# Patient Record
Sex: MALE | Race: WHITE | URBAN - METROPOLITAN AREA
[De-identification: names, ages, dates, MRNs, and addresses within clinical notes are randomized per-mention and may not be internally consistent; named-entity substitution may affect disease eponyms.]

---

## 2017-01-07 DIAGNOSIS — N40.0 BENIGN NON-NODULAR PROSTATIC HYPERPLASIA WITHOUT LOWER URINARY TRACT SYMPTOMS: Primary | ICD-10-CM

## 2017-01-09 NOTE — TELEPHONE ENCOUNTER
tamsulosin (FLOMAX) 0.4 MG 24 hr capsule         Last Written Prescription Date: 10/10/16  Last Fill Quantity: 90, # refills: 0  Last Office Visit with [facility], [facility] or [facility] prescribing provider:  10/10/16.     Future Office Visit:      BP Readings from Last 3 Encounters:   10/10/16 102/68   12/30/15 111/69   11/17/15 116/78

## 2017-01-10 RX ORDER — TAMSULOSIN HYDROCHLORIDE 0.4 MG/1
CAPSULE ORAL
Qty: 90 CAPSULE | Refills: 3 | Status: SHIPPED | OUTPATIENT
Start: 2017-01-10

## 2017-01-10 NOTE — TELEPHONE ENCOUNTER
Status: Signed         Expand All Collapse All    tamsulosin (FLOMAX) 0.4 MG 24 hr capsule         Last Written Prescription Date: 10/10/16  Last Fill Quantity: 90, # refills: 0  Last Office Visit with [facility], [facility] or [facility] prescribing provider:  10/10/16.      Future Office Visit:        BP Readings from Last 3 Encounters:    10/10/16  102/68    12/30/15  111/69    11/17/15  116/78                   Refill provided per protocol

## 2017-02-27 ENCOUNTER — TELEPHONE (OUTPATIENT)
Dept: PEDIATRICS | Facility: CLINIC | Age: 77
End: 2017-02-27

## 2017-02-27 NOTE — TELEPHONE ENCOUNTER
Patient walked into clinic to get appt with Dr. Glass.  Triaged patient.  He was walking down steps and the steps broke and he fell about 10 feet.  The stairs fell on top of him on his back.  He is in pain, but able to walk.  terese sosa.  Informed patient he needs to go to ER to be evaluated and possible imaging done.  He is with his son.  Recommended his son drive, patient states I drove here, he can follow me.  Patient agreed to go to  ER.    Nanette Aly RN,   [facility]

## 2017-03-01 NOTE — TELEPHONE ENCOUNTER
Attempted to contact patient.  No answer:  Busy signal.    Nanette Aly RN,   [facility]

## 2017-03-02 ENCOUNTER — RADIANT APPOINTMENT (OUTPATIENT)
Dept: GENERAL RADIOLOGY | Facility: CLINIC | Age: 77
End: 2017-03-02
Attending: PREVENTIVE MEDICINE
Payer: COMMERCIAL

## 2017-03-02 ENCOUNTER — OFFICE VISIT (OUTPATIENT)
Dept: ORTHOPEDICS | Facility: CLINIC | Age: 77
End: 2017-03-02
Payer: COMMERCIAL

## 2017-03-02 VITALS — DIASTOLIC BLOOD PRESSURE: 82 MMHG | SYSTOLIC BLOOD PRESSURE: 126 MMHG | HEART RATE: 73 BPM

## 2017-03-02 DIAGNOSIS — M79.605 LEFT LEG PAIN: ICD-10-CM

## 2017-03-02 DIAGNOSIS — M18.11 DEGENERATIVE ARTHRITIS OF THUMB, RIGHT: ICD-10-CM

## 2017-03-02 DIAGNOSIS — M25.541 PAIN IN THUMB JOINT WITH MOVEMENT OF RIGHT HAND: Primary | ICD-10-CM

## 2017-03-02 DIAGNOSIS — S90.02XA CONTUSION OF ANKLE, LEFT: ICD-10-CM

## 2017-03-02 DIAGNOSIS — M25.541 PAIN IN THUMB JOINT WITH MOVEMENT OF RIGHT HAND: ICD-10-CM

## 2017-03-02 PROCEDURE — 73140 X-RAY EXAM OF FINGER(S): CPT | Mod: RT | Performed by: RADIOLOGY

## 2017-03-02 PROCEDURE — 99213 OFFICE O/P EST LOW 20 MIN: CPT | Performed by: PREVENTIVE MEDICINE

## 2017-03-02 PROCEDURE — 73590 X-RAY EXAM OF LOWER LEG: CPT | Mod: LT | Performed by: RADIOLOGY

## 2017-03-02 ASSESSMENT — PAIN SCALES - GENERAL: PAINLEVEL: EXTREME PAIN (8)

## 2017-03-02 NOTE — TELEPHONE ENCOUNTER
Called patient, left message with phone number to call back.   Linda Rosales RN, [facility]

## 2017-03-02 NOTE — MR AVS SNAPSHOT
After Visit Summary   3/2/2017    Andrés Hagen    MRN: [id number]           Patient Information     Date Of Birth          1940        Visit Information        Provider Department      3/2/2017 2:00 PM Jair Martel MD [facility]        Today's Diagnoses     Pain in thumb joint with movement of right hand    -  1    Left leg pain        Contusion of ankle, left        Degenerative arthritis of thumb, right           Follow-ups after your visit        Follow-up notes from your care team     Return in about 1 week (around 3/9/2017).      Your next 10 appointments already scheduled     Mar 20, 2017  7:00 AM CDT   Return Visit with Jair Martel MD   [facility] ([facility]    [address] [place] [id number] [phone]            Apr 10, 2017  8:40 AM CDT   Return Visit with Juventino Glass MD   [facility] ([facility])    [address] [place] [id number] [phone]              Who to contact     If you have questions or need follow up information about today's clinic visit or your schedule please contact [facility] directly at [phone].  Normal or non-critical lab and imaging results will be communicated to you by MyChart, letter or phone within 4 business days after the clinic has received the results. If you do not hear from us within 7 days, please contact the clinic through [employer] or phone. If you have a critical or abnormal lab result, we will notify you by phone as soon as possible.  Submit refill requests through [employer] or call your pharmacy and they will forward the refill request to us. Please allow 3 business days for your refill to be completed.          Additional Information About Your Visit        [employer] Information     [employer] is an electronic gateway that provides easy, online access to your medical records. With  [employer], you can request a clinic appointment, read your test results, renew a prescription or communicate with your care team.     To sign up for [email or web address] visit the website at [email or web address]   You will be asked to enter the access code listed below, as well as some personal information. Please follow the directions to create your username and password.     Your access code is: [email or web address]  Expires: 2017  5:12 PM     Your access code will  in 90 days. If you need help or a new code, please contact your [facility] or call [phone] for assistance.        Care EveryWhere ID     This is your Care EveryWhere ID. This could be used by other organizations to access your [place] medical records  [id number]        Your Vitals Were     Pulse                   73            Blood Pressure from Last 3 Encounters:   17 126/82   10/10/16 102/68   12/30/15 111/69    Weight from Last 3 Encounters:   10/10/16 100 kg (220 lb 8 oz)   12/30/15 103.9 kg (229 lb 1.6 oz)   11/17/15 102.5 kg (226 lb)               Primary Care Provider Office Phone # Fax #    Juventino Glass [phone]       [facility] [address]  [facility] [address]        Thank you!     Thank you for choosing [facility]  for your care. Our goal is always to provide you with excellent care. Hearing back from our patients is one way we can continue to improve our services. Please take a few minutes to complete the written survey that you may receive in the mail after your visit with us. Thank you!             Your Updated Medication List - Protect others around you: Learn how to safely use, store and throw away your medicines at www.disposemymeds.org.          This list is accurate as of: 3/2/17  5:12 PM.  Always use your most recent med list.                   Brand Name Dispense Instructions for use    ASPIRIN PO      Take 81 mg by mouth daily        atorvastatin 80 MG tablet    LIPITOR    90 tablet    TAKE 1 TABLET EVERY DAY       DAILY MULTIVITAMIN PO      Take 1 tablet by mouth daily       glucosamine-chondroitinoitin Caps      Take 1 tablet by mouth 2 times daily       losartan 25 MG tablet    COZAAR    90 tablet    TAKE 1 TABLET EVERY DAY       metoprolol 25 MG 24 hr tablet    TOPROL-XL    45 tablet    Take 1/2 tablet daily       OMEGA-3 FISH OIL PO      Take 1 capsule by mouth 2 times daily (with meals)       tamsulosin 0.4 MG capsule    FLOMAX    90 capsule    TAKE 1 CAPSULE EVERY DAY

## 2017-03-02 NOTE — TELEPHONE ENCOUNTER
JUDIE for Dr. Glass.    Patient called back. Discussed he did go to the [facility].  They ruled out a hip, pelvis or back fracture.  He rates pain 1/10 in his hip and back.    He states he had a complaint of R thumb pain, rates 10/10, it is black and blue.  He did not have this evaluated at the ER due to they were focused on hip and back.      He has taken Tylenol 500 mg 3 tabs 3 times per day yesterday and today has taken 3 tabs.  Not helping.  Discussed maximum dosage is 2 tabs 3 times per day.    Called [facility], they see the complain in the initial triage note, but then he did not complain again, so he cannot go to the ER for evaluation unless he pays for visit.    Ortho has openings today, patient is in agreement to see ortho.     Schedule with Dr. Martel at 2:00 pm today to evaluate his thumb.    Linda Rosales RN, [facility]

## 2017-03-02 NOTE — Clinical Note
Andrés Whiting will discuss with you a possible referral to neurology to address the right arm tremor that has been worsening over the past 6 months

## 2017-03-02 NOTE — PROGRESS NOTES
HISTORY OF PRESENT ILLNESS  Mr. Hagen is a pleasant 76 year old year old male who presents to clinic today for evaluation of right thumb pain and left lower extremity pain.    Patient states that on Monday 2/27/2017 he fell about 10 feet while landing of his stomach while he walking down the stairs which were recently built by his son. Stairs were recently built by his son and suddenly broke leading patient to fall. Patient states that since then he developed back, left hip, left ankle and right thumb pain. Patient was seen at [facility] on 2/27/2017 where he had Xrays of back and left hip that were negative for fracture.  Back and hip pan have been getting better but his thumb pain has not been improving    Per patient's report right thumb pain gets worse with movement and improves at rest. Intensity is 8/10 and pain is dull. This has been accompanied by swelling and ecchymosis. He has been taking tylenol with no relief of pain. Patient has been unable to eat or perform any activities with his right hand due to pain.    During ROS, patient states he has pain in lateral aspect of left lower extremity which started after the fall. It was also noticed patient had a right arm tremor at rest that worsens with movement.       MEDICAL HISTORY  Patient Active Problem List   Diagnosis     Coronary artery disease involving coronary bypass graft of native heart without angina pectoris     Bilateral leg edema     Hyperlipidemia with target LDL less than 100     Obesity (BMI 30-39.9)     CKD (chronic kidney disease) stage 3, GFR 30-59 ml/min     Essential and other specified forms of tremor     Prediabetes     S/P balloon mitral valvuloplasty     Elevated alkaline phosphatase level     Cataract     Benign non-nodular prostatic hyperplasia without lower urinary tract symptoms     Essential hypertension with goal blood pressure less than 130/80     Essential tremor     Mild concentric left ventricular hypertrophy (LVH)     Hepatic  lesion     Calculus of gallbladder without cholecystitis without obstruction     Hepatic cyst     Renal cyst       Current Outpatient Prescriptions   Medication Sig Dispense Refill     tamsulosin (FLOMAX) 0.4 MG capsule TAKE 1 CAPSULE EVERY DAY 90 capsule 3     atorvastatin (LIPITOR) 80 MG tablet TAKE 1 TABLET EVERY DAY 90 tablet 2     losartan (COZAAR) 25 MG tablet TAKE 1 TABLET EVERY DAY 90 tablet 2     metoprolol (TOPROL-XL) 25 MG 24 hr tablet Take 1/2 tablet daily 45 tablet 2     Multiple Vitamin (DAILY MULTIVITAMIN PO) Take 1 tablet by mouth daily       Glucosamine-Chondroit-Vit C-Mn (GLUCOSAMINE-CHONDROITINOITIN) CAPS Take 1 tablet by mouth 2 times daily       Omega-3 Fatty Acids (OMEGA-3 FISH OIL PO) Take 1 capsule by mouth 2 times daily (with meals)       ASPIRIN PO Take 81 mg by mouth daily         No Known Allergies    Family History   Problem Relation Age of Onset     HEART DISEASE Mother      Kidney Cancer Mother      HEART DISEASE Father      Kidney Cancer Sister        Additional medical/Social/Surgical histories reviewed in [facility] and updated as appropriate.     REVIEW OF SYSTEMS (3/2/2017)  10 point ROS of systems including Constitutional, Eyes, Respiratory, Cardiovascular, Gastroenterology, Genitourinary, Integumentary, Musculoskeletal, Psychiatric were all negative except for pertinent positives noted in my HPI.     PHYSICAL EXAM  Vitals:    03/02/17 1406   BP: 126/82   Pulse: 73     Vital Signs: /82  Pulse 73 Patient declined being weighed. There is no height or weight on file to calculate BMI.    General  - normal appearance, in no obvious distress  CV  - normal radial pulse  Pulm  - normal respiratory pattern, non-labored  Musculoskeletal - wrist  - inspection: normal joint alignment, no obvious deformity, no swelling  - palpation: no bony or soft tissue tenderness, no tenderness at the anatomical snuffbox  - ROM:  90 deg flexion   70 deg extension   25 deg abduction   65 deg adduction  -  strength: 5/5  strength, 5/5 flexion, extension, pronation, supination, adduction, abduction  - special tests:  (-) Tinel's  (-) Finkelstein  (-) Phalen  (-) Linares click test  (-) ulnar impaction     Neuro  - no sensory or motor deficit, grossly normal coordination, normal muscle tone  Skin  - no ecchymosis, erythema, warmth, or induration, no obvious rash  Psych  - interactive, appropriate, normal mood and affect    Upper extremities  -Right thumb: diffuse tenderness, swelling and ecchymosis noted in right thumb. Limited abduction and flexion of distal interphalangeal joint due to pain.  -Significant tremor noted in right arm    Back:  Paravertebral muscles noted to be tight  Adequate rotation, flexion and extension  Negative straight leg raise and slump test    Lower Extremities:   -Left hip: adequate ROM   -Small ecchymosis and tenderness to palpation over lateral aspects of left extremity and left ankle. No edema noted.       ASSESSMENT & PLAN    Mr. Hagen is a pleasant 76 year old year old male who presents to clinic today for evaluation of right thumb pain, left hip pain, back pain  and left lower extremity pain after a fall on 2/27. Patient was initially seen at [facility], where he had imaging of hips and back that were negative for fracture. Xrays of his right thumb and left ankle performed today also negative for fracture.  Recommended to apply ice to right thumb and left ankle as well as taking Ibuprofen to reduce inflammation. Ace wrap applied today for protection of right thumb. In terms of his back back and hip pain, patient states symptoms have been improving significantly over the past few days and will like to continue expectant management. Patient will follow up in 1-2 weeks to monitor for progress.  He was recommended to follow up with his PCP Dr Glass for further evaluation of his right arm tremor. Patient expressed understanding and agreed with plan.       Jair Martel MD, [facility]

## 2017-03-20 ENCOUNTER — OFFICE VISIT (OUTPATIENT)
Dept: ORTHOPEDICS | Facility: CLINIC | Age: 77
End: 2017-03-20
Payer: COMMERCIAL

## 2017-03-20 VITALS — SYSTOLIC BLOOD PRESSURE: 118 MMHG | DIASTOLIC BLOOD PRESSURE: 75 MMHG | HEART RATE: 69 BPM

## 2017-03-20 DIAGNOSIS — M18.11 DEGENERATIVE ARTHRITIS OF THUMB, RIGHT: ICD-10-CM

## 2017-03-20 DIAGNOSIS — M25.541 PAIN IN THUMB JOINT WITH MOVEMENT OF RIGHT HAND: Primary | ICD-10-CM

## 2017-03-20 PROCEDURE — 99213 OFFICE O/P EST LOW 20 MIN: CPT | Performed by: PREVENTIVE MEDICINE

## 2017-03-20 ASSESSMENT — PAIN SCALES - GENERAL: PAINLEVEL: MODERATE PAIN (5)

## 2017-03-20 NOTE — PATIENT INSTRUCTIONS
Thanks for coming today.  Ortho/Sports Medicine Clinic  [address] [place] [address]    To schedule future appointments in Ortho Clinic, you may call [phone].    To schedule ordered imaging by your Provider: Call [facility] at [phone]    [email or web address] available online at:   [email or web address]    Please call if any further questions or concerns [phone] and ask for the Orthopedic Department. Clinic hours 8 am to 5 pm.    Return to clinic if symptoms worsen.

## 2017-03-20 NOTE — MR AVS SNAPSHOT
After Visit Summary   3/20/2017    Andrés Hagen    MRN: [id number]           Patient Information     Date Of Birth          1940        Visit Information        Provider Department      3/20/2017 7:00 AM Jair Martel MD [facility]        Today's Diagnoses     Pain in thumb joint with movement of right hand    -  1    Degenerative arthritis of thumb, right          Care Instructions    Thanks for coming today.  Ortho/Sports Medicine Clinic  [address] [place] [id number]    To schedule future appointments in Ortho Clinic, you may call [phone].    To schedule ordered imaging by your Provider: Call [facility] at [phone]    [employer] available online at:   [email or web address]    Please call if any further questions or concerns [phone] and ask for the Orthopedic Department. Clinic hours 8 am to 5 pm.    Return to clinic if symptoms worsen.        Follow-ups after your visit        Your next 10 appointments already scheduled     Apr 10, 2017  8:40 AM CDT   Return Visit with Juventino Glass MD   [facility] ([facility])    [address] [place] [id number] [phone]              Who to contact     If you have questions or need follow up information about today's clinic visit or your schedule please contact [facility] directly at [phone].  Normal or non-critical lab and imaging results will be communicated to you by MyChart, letter or phone within 4 business days after the clinic has received the results. If you do not hear from us within 7 days, please contact the clinic through [employer] or phone. If you have a critical or abnormal lab result, we will notify you by phone as soon as possible.  Submit refill requests through [employer] or call your pharmacy and they will forward the refill request to us. Please allow 3 business days for your refill to be  completed.          Additional Information About Your Visit        [employer] Information     [employer] is an electronic gateway that provides easy, online access to your medical records. With [employer] you can request a clinic appointment, read your test results, renew a prescription or communicate with your care team.     To sign up for [employer] visit the website at [email or web address]   You will be asked to enter the access code listed below, as well as some personal information. Please follow the directions to create your username and password.     Your access code is: [email or web address]  Expires: 2017  6:12 PM     Your access code will  in 90 days. If you need help or a new code, please contact your [facility] or call [phone] for assistance.        Care EveryWhere ID     This is your Care EveryWhere ID. This could be used by other organizations to access your [place] medical records  [id number]        Your Vitals Were     Pulse                   69            Blood Pressure from Last 3 Encounters:   17 118/75   17 126/82   10/10/16 102/68    Weight from Last 3 Encounters:   10/10/16 100 kg (220 lb 8 oz)   12/30/15 103.9 kg (229 lb 1.6 oz)   11/17/15 102.5 kg (226 lb)              Today, you had the following     No orders found for display       Primary Care Provider Office Phone # Fax #    Juventino Glass [phone]       [facility] [address]  [facility] [address]        Thank you!     Thank you for choosing [facility]  for your care. Our goal is always to provide you with excellent care. Hearing back from our patients is one way we can continue to improve our services. Please take a few minutes to complete the written survey that you may receive in the mail after your visit with us. Thank you!             Your Updated Medication List - Protect others around you: Learn how to safely use, store and  throw away your medicines at www.disposemymeds.org.          This list is accurate as of: 3/20/17  7:40 AM.  Always use your most recent med list.                   Brand Name Dispense Instructions for use    ASPIRIN PO      Take 81 mg by mouth daily       atorvastatin 80 MG tablet    LIPITOR    90 tablet    TAKE 1 TABLET EVERY DAY       DAILY MULTIVITAMIN PO      Take 1 tablet by mouth daily       glucosamine-chondroitinoitin Caps      Take 1 tablet by mouth 2 times daily       losartan 25 MG tablet    COZAAR    90 tablet    TAKE 1 TABLET EVERY DAY       metoprolol 25 MG 24 hr tablet    TOPROL-XL    45 tablet    Take 1/2 tablet daily       OMEGA-3 FISH OIL PO      Take 1 capsule by mouth 2 times daily (with meals)       tamsulosin 0.4 MG capsule    FLOMAX    90 capsule    TAKE 1 CAPSULE EVERY DAY

## 2017-03-20 NOTE — NURSING NOTE
"Andrés Hagen's goals for this visit include: Follow up for right hand and thumb pain which has improved.   He requests these members of his care team be copied on today's visit information: no    PCP: Juventino Glass    Referring Provider:  No referring provider defined for this encounter.    Chief Complaint   Patient presents with     RECHECK     Follow up for right hand and thumb pain. Reporting improvement and a decrease in pain since last visit.        Initial /75  Pulse 69 Estimated body mass index is 31.64 kg/(m^2) as calculated from the following:    Height as of 10/10/16: 1.778 m (5' 10\").    Weight as of 10/10/16: 100 kg (220 lb 8 oz).  Medication Reconciliation: complete  "

## 2017-03-20 NOTE — PROGRESS NOTES
HISTORY OF PRESENT ILLNESS  Andrés returns for followup for his thumb injury and back and hip injury. Only his thumb continues to bother him. He has only been using an ace wrap on it. He has not immobilized it yet. He also states that he re injured it over the weekend due to his dog 'taking off on him and causing him to fall and injure it.    See below for previous history    Mr. Hagen is a pleasant 76 year old year old male who presents to clinic today for evaluation of right thumb pain and left lower extremity pain.    Patient states that on Monday 2/27/2017 he fell about 10 feet while landing of his stomach while he walking down the stairs which were recently built by his son. Stairs were recently built by his son and suddenly broke leading patient to fall. Patient states that since then he developed back, left hip, left ankle and right thumb pain. [facility] where he had Xrays of back and left hip that were negative for fracture.  Back and hip pan have been getting better but his thumb pain has not been improving    Per patient's report right thumb pain gets worse with movement and improves at rest. Intensity is 8/10 and pain is dull. This has been accompanied by swelling and ecchymosis. He has been taking tylenol with no relief of pain. Patient has been unable to eat or perform any activities with his right hand due to pain.    During ROS, patient states he has pain in lateral aspect of left lower extremity which started after the fall. It was also noticed patient had a right arm tremor at rest that worsens with movement.       MEDICAL HISTORY  Patient Active Problem List   Diagnosis     Coronary artery disease involving coronary bypass graft of native heart without angina pectoris     Bilateral leg edema     Hyperlipidemia with target LDL less than 100     Obesity (BMI 30-39.9)     CKD (chronic kidney disease) stage 3, GFR 30-59 ml/min     Essential and other specified forms of  tremor     Prediabetes     S/P balloon mitral valvuloplasty     Elevated alkaline phosphatase level     Cataract     Benign non-nodular prostatic hyperplasia without lower urinary tract symptoms     Essential hypertension with goal blood pressure less than 130/80     Essential tremor     Mild concentric left ventricular hypertrophy (LVH)     Hepatic lesion     Calculus of gallbladder without cholecystitis without obstruction     Hepatic cyst     Renal cyst       Current Outpatient Prescriptions   Medication Sig Dispense Refill     tamsulosin (FLOMAX) 0.4 MG capsule TAKE 1 CAPSULE EVERY DAY 90 capsule 3     atorvastatin (LIPITOR) 80 MG tablet TAKE 1 TABLET EVERY DAY 90 tablet 2     losartan (COZAAR) 25 MG tablet TAKE 1 TABLET EVERY DAY 90 tablet 2     metoprolol (TOPROL-XL) 25 MG 24 hr tablet Take 1/2 tablet daily 45 tablet 2     Multiple Vitamin (DAILY MULTIVITAMIN PO) Take 1 tablet by mouth daily       Glucosamine-Chondroit-Vit C-Mn (GLUCOSAMINE-CHONDROITINOITIN) CAPS Take 1 tablet by mouth 2 times daily       Omega-3 Fatty Acids (OMEGA-3 FISH OIL PO) Take 1 capsule by mouth 2 times daily (with meals)       ASPIRIN PO Take 81 mg by mouth daily         No Known Allergies    Family History   Problem Relation Age of Onset     HEART DISEASE Mother      Kidney Cancer Mother      HEART DISEASE Father      Kidney Cancer Sister        Additional medical/Social/Surgical histories reviewed in [facility] and updated as appropriate.     REVIEW OF SYSTEMS (3/20/2017)  10 point ROS of systems including Constitutional, Eyes, Respiratory, Cardiovascular, Gastroenterology, Genitourinary, Integumentary, Musculoskeletal, Psychiatric were all negative except for pertinent positives noted in my HPI.     PHYSICAL EXAM  Vitals:    03/20/17 0700   BP: 118/75   Pulse: 69     Vital Signs: /75  Pulse 69 Patient declined being weighed. There is no height or weight on file to calculate BMI.    General  - normal appearance, in no obvious  distress  CV  - normal radial pulse  Pulm  - normal respiratory pattern, non-labored  Musculoskeletal - right hand/ wrist  - inspection: normal joint alignment, no obvious deformity, no swelling  - palpation: no bony or soft tissue tenderness except at the IP joint, no tenderness at the anatomical snuffbox  - ROM:  90 deg flexion   70 deg extension   25 deg abduction   65 deg adduction  - strength: 5/5  strength, 5/5 flexion, extension, pronation, supination, adduction, abduction  - special tests:  (-) Tinel's  (-) Finkelstein  (-) Phalen  (-) Linares click test  (-) ulnar impaction     Neuro  - no sensory or motor deficit, grossly normal coordination, normal muscle tone  Skin  - no ecchymosis, erythema, warmth, or induration, no obvious rash  Psych  - interactive, appropriate, normal mood and affect    Upper extremities  -Right thumb: IP joint tenderness, swelling and NO ecchymosis noted in right thumb. Still has some Limited abduction and flexion of distal interphalangeal joint due to pain.  -Significant tremor noted in right arm    Back:  Paravertebral muscles normal  Adequate rotation, flexion and extension  Negative straight leg raise and slump test    Lower Extremities:   -Left hip: adequate ROM   -Small ecchymosis and tenderness to palpation over lateral aspects of left extremity and left ankle. No edema noted. improved      ASSESSMENT & PLAN  77 yo male with right thumb pain due to arthritis and sprain.   Hip and back pain resolved.  -given thumb brace to wear during the day.   -ice and ibuprofen PRN for thumb  F/u in 2- 3 weeks   Will consider cortisone injection at that time if not improving      Jair Martel MD, CAM

## 2017-04-10 ENCOUNTER — OFFICE VISIT (OUTPATIENT)
Dept: ORTHOPEDICS | Facility: CLINIC | Age: 77
End: 2017-04-10
Payer: COMMERCIAL

## 2017-04-10 ENCOUNTER — TELEPHONE (OUTPATIENT)
Dept: PEDIATRICS | Facility: CLINIC | Age: 77
End: 2017-04-10

## 2017-04-10 ENCOUNTER — OFFICE VISIT (OUTPATIENT)
Dept: PEDIATRICS | Facility: CLINIC | Age: 77
End: 2017-04-10
Payer: COMMERCIAL

## 2017-04-10 VITALS
OXYGEN SATURATION: 96 % | BODY MASS INDEX: 31.22 KG/M2 | DIASTOLIC BLOOD PRESSURE: 74 MMHG | HEIGHT: 70 IN | WEIGHT: 218.1 LBS | SYSTOLIC BLOOD PRESSURE: 126 MMHG | HEART RATE: 75 BPM | TEMPERATURE: 97 F

## 2017-04-10 VITALS — DIASTOLIC BLOOD PRESSURE: 78 MMHG | SYSTOLIC BLOOD PRESSURE: 128 MMHG | HEART RATE: 69 BPM

## 2017-04-10 DIAGNOSIS — E78.5 HYPERLIPIDEMIA WITH TARGET LDL LESS THAN 100: ICD-10-CM

## 2017-04-10 DIAGNOSIS — Z13.6 ENCOUNTER FOR ABDOMINAL AORTIC ANEURYSM SCREENING: Primary | ICD-10-CM

## 2017-04-10 DIAGNOSIS — R25.1 TREMOR: ICD-10-CM

## 2017-04-10 DIAGNOSIS — J06.9 UPPER RESPIRATORY TRACT INFECTION, UNSPECIFIED TYPE: ICD-10-CM

## 2017-04-10 DIAGNOSIS — I10 ESSENTIAL HYPERTENSION WITH GOAL BLOOD PRESSURE LESS THAN 130/80: ICD-10-CM

## 2017-04-10 DIAGNOSIS — M18.11 DEGENERATIVE ARTHRITIS OF THUMB, RIGHT: ICD-10-CM

## 2017-04-10 DIAGNOSIS — M25.541 PAIN IN THUMB JOINT WITH MOVEMENT OF RIGHT HAND: Primary | ICD-10-CM

## 2017-04-10 DIAGNOSIS — I25.810 CORONARY ARTERY DISEASE INVOLVING CORONARY BYPASS GRAFT OF NATIVE HEART WITHOUT ANGINA PECTORIS: ICD-10-CM

## 2017-04-10 DIAGNOSIS — N18.30 CKD (CHRONIC KIDNEY DISEASE) STAGE 3, GFR 30-59 ML/MIN (H): ICD-10-CM

## 2017-04-10 PROCEDURE — 99212 OFFICE O/P EST SF 10 MIN: CPT | Performed by: PREVENTIVE MEDICINE

## 2017-04-10 PROCEDURE — 99214 OFFICE O/P EST MOD 30 MIN: CPT | Performed by: FAMILY MEDICINE

## 2017-04-10 RX ORDER — METOPROLOL SUCCINATE 25 MG/1
TABLET, EXTENDED RELEASE ORAL
Qty: 45 TABLET | Refills: 2 | Status: SHIPPED | OUTPATIENT
Start: 2017-04-10

## 2017-04-10 RX ORDER — ATORVASTATIN CALCIUM 80 MG/1
TABLET, FILM COATED ORAL
Qty: 90 TABLET | Refills: 2 | Status: SHIPPED | OUTPATIENT
Start: 2017-04-10

## 2017-04-10 RX ORDER — LOSARTAN POTASSIUM 25 MG/1
TABLET ORAL
Qty: 90 TABLET | Refills: 2 | Status: SHIPPED | OUTPATIENT
Start: 2017-04-10

## 2017-04-10 ASSESSMENT — PAIN SCALES - GENERAL
PAINLEVEL: NO PAIN (0)
PAINLEVEL: MILD PAIN (2)

## 2017-04-10 NOTE — TELEPHONE ENCOUNTER
Juventino Glass MD  [facility]                   Unsure if patient had labs today   Please call and let him know to get the labs as we discussed

## 2017-04-10 NOTE — MR AVS SNAPSHOT
After Visit Summary   4/10/2017    Andrés Hagen    MRN: [id number]           Patient Information     Date Of Birth          1940        Visit Information        Provider Department      4/10/2017 8:00 AM Jair Martel MD [facility]        Today's Diagnoses     Pain in thumb joint with movement of right hand    -  1    Degenerative arthritis of thumb, right          Care Instructions    Thanks for coming today.  Ortho/Sports Medicine Clinic  [address] [place] [address]    To schedule future appointments in Ortho Clinic, you may call [phone].    To schedule ordered imaging by your Provider: Call [facility] at [phone]    [employer] available online at:   [employer]    Please call if any further questions or concerns [phone] and ask for the Orthopedic Department. Clinic hours 8 am to 5 pm.    Return to clinic if symptoms worsen.        Follow-ups after your visit        Future tests that were ordered for you today     Open Future Orders        Priority Expected Expires Ordered    US abdominal aorta limited Routine  4/10/2018 4/10/2017            Who to contact     If you have questions or need follow up information about today's clinic visit or your schedule please contact [facility] directly at [phone].  Normal or non-critical lab and imaging results will be communicated to you by MyChart, letter or phone within 4 business days after the clinic has received the results. If you do not hear from us within 7 days, please contact the clinic through [employer] or phone. If you have a critical or abnormal lab result, we will notify you by phone as soon as possible.  Submit refill requests through [employer] or call your pharmacy and they will forward the refill request to us. Please allow 3 business days for your refill to be completed.          Additional Information About Your Visit        [employer]  Information     [employer] is an electronic gateway that provides easy, online access to your medical records. With [employer], you can request a clinic appointment, read your test results, renew a prescription or communicate with your care team.     To sign up for [employer] visit the website at [email or web address]   You will be asked to enter the access code listed below, as well as some personal information. Please follow the directions to create your username and password.     Your access code is: [email or web address]  Expires: 2017  6:12 PM     Your access code will  in 90 days. If you need help or a new code, please contact your [facility] or call [phone] for assistance.        Care EveryWhere ID     This is your Care EveryWhere ID. This could be used by other organizations to access your [place] medical records  [id number]        Your Vitals Were     Pulse                   69            Blood Pressure from Last 3 Encounters:   04/10/17 126/74   04/10/17 128/78   17 118/75    Weight from Last 3 Encounters:   04/10/17 98.9 kg (218 lb 1.6 oz)   10/10/16 100 kg (220 lb 8 oz)   12/30/15 103.9 kg (229 lb 1.6 oz)              Today, you had the following     No orders found for display       Primary Care Provider Office Phone # Fax #    Juventino Glass [phone]       [facility] [address] [facility] [address]        Thank you!     Thank you for choosing [facility]  for your care. Our goal is always to provide you with excellent care. Hearing back from our patients is one way we can continue to improve our services. Please take a few minutes to complete the written survey that you may receive in the mail after your visit with us. Thank you!             Your Updated Medication List - Protect others around you: Learn how to safely use, store and throw away your medicines at www.disposemymeds.org.          This  list is accurate as of: 4/10/17  8:49 AM.  Always use your most recent med list.                   Brand Name Dispense Instructions for use    ASPIRIN PO      Take 81 mg by mouth daily       atorvastatin 80 MG tablet    LIPITOR    90 tablet    TAKE 1 TABLET EVERY DAY       DAILY MULTIVITAMIN PO      Take 1 tablet by mouth daily       glucosamine-chondroitinoitin Caps      Take 1 tablet by mouth 2 times daily       losartan 25 MG tablet    COZAAR    90 tablet    TAKE 1 TABLET EVERY DAY       metoprolol 25 MG 24 hr tablet    TOPROL-XL    45 tablet    Take 1/2 tablet daily       OMEGA-3 FISH OIL PO      Take 1 capsule by mouth 2 times daily (with meals)       tamsulosin 0.4 MG capsule    FLOMAX    90 capsule    TAKE 1 CAPSULE EVERY DAY

## 2017-04-10 NOTE — TELEPHONE ENCOUNTER
Called patient in regards to note below from PCP. Patient was unable to hear staff so phone was passed to patient's son, Winston. Advised Winston of provider note below. Patient has ultrasound appointment on 04/11/17 at clinic. Lab appointment was scheduled on 04/11/17 after ultrasound to complete BMP.  Sushma Orr CMA

## 2017-04-10 NOTE — NURSING NOTE
"Andrés Hagen's goals for this visit include: Follow up for right thumb.   He requests these members of his care team be copied on today's visit information: no    PCP: Juventino Glass    Referring Provider:  No referring provider defined for this encounter.    Chief Complaint   Patient presents with     RECHECK     Follow up for right thumb pain. Reporting some improvement but still some pain.        Initial /78  Pulse 69 Estimated body mass index is 31.64 kg/(m^2) as calculated from the following:    Height as of 10/10/16: 1.778 m (5' 10\").    Weight as of 10/10/16: 100 kg (220 lb 8 oz).  Medication Reconciliation: complete  "

## 2017-04-10 NOTE — PROGRESS NOTES
HISTORY OF PRESENT ILLNESS  Andrés returns for followup for right thumb injury. He overall has been feeling better. Doesn't want an injection today. He thinks he has improved week to week and now his hand only hurts when he uses a nail gun.    See below for previous history    Mr. Hagen is a pleasant 76 year old year old male who presents to clinic today for evaluation of right thumb pain and left lower extremity pain.    Patient states that on Monday 2/27/2017 he fell about 10 feet while landing of his stomach while he walking down the stairs which were recently built by his son. Stairs were recently built by his son and suddenly broke leading patient to fall. Patient states that since then he developed back, left hip, left ankle and right thumb pain. Patient was seen at [facility] on 2/27/2017 where he had Xrays of back and left hip that were negative for fracture.  Back and hip pan have been getting better but his thumb pain has not been improving    Per patient's report right thumb pain gets worse with movement and improves at rest. Intensity is 8/10 and pain is dull. This has been accompanied by swelling and ecchymosis. He has been taking tylenol with no relief of pain. Patient has been unable to eat or perform any activities with his right hand due to pain.    During ROS, patient states he has pain in lateral aspect of left lower extremity which started after the fall. It was also noticed patient had a right arm tremor at rest that worsens with movement.       MEDICAL HISTORY  Patient Active Problem List   Diagnosis     Coronary artery disease involving coronary bypass graft of native heart without angina pectoris     Bilateral leg edema     Hyperlipidemia with target LDL less than 100     Obesity (BMI 30-39.9)     CKD (chronic kidney disease) stage 3, GFR 30-59 ml/min     Essential and other specified forms of tremor     Prediabetes     S/P balloon mitral valvuloplasty     Elevated alkaline phosphatase level      Cataract     Benign non-nodular prostatic hyperplasia without lower urinary tract symptoms     Essential hypertension with goal blood pressure less than 130/80     Essential tremor     Mild concentric left ventricular hypertrophy (LVH)     Hepatic lesion     Calculus of gallbladder without cholecystitis without obstruction     Hepatic cyst     Renal cyst       Current Outpatient Prescriptions   Medication Sig Dispense Refill     tamsulosin (FLOMAX) 0.4 MG capsule TAKE 1 CAPSULE EVERY DAY 90 capsule 3     atorvastatin (LIPITOR) 80 MG tablet TAKE 1 TABLET EVERY DAY 90 tablet 2     losartan (COZAAR) 25 MG tablet TAKE 1 TABLET EVERY DAY 90 tablet 2     metoprolol (TOPROL-XL) 25 MG 24 hr tablet Take 1/2 tablet daily 45 tablet 2     Multiple Vitamin (DAILY MULTIVITAMIN PO) Take 1 tablet by mouth daily       Glucosamine-Chondroit-Vit C-Mn (GLUCOSAMINE-CHONDROITINOITIN) CAPS Take 1 tablet by mouth 2 times daily       Omega-3 Fatty Acids (OMEGA-3 FISH OIL PO) Take 1 capsule by mouth 2 times daily (with meals)       ASPIRIN PO Take 81 mg by mouth daily         No Known Allergies    Family History   Problem Relation Age of Onset     HEART DISEASE Mother      Kidney Cancer Mother      HEART DISEASE Father      Kidney Cancer Sister        Additional medical/Social/Surgical histories reviewed in [facility] and updated as appropriate.     REVIEW OF SYSTEMS (4/10/2017)  10 point ROS of systems including Constitutional, Eyes, Respiratory, Cardiovascular, Gastroenterology, Genitourinary, Integumentary, Musculoskeletal, Psychiatric were all negative except for pertinent positives noted in my HPI.     PHYSICAL EXAM  Vitals:    04/10/17 0808   BP: 128/78   Pulse: 69     Vital Signs: /78  Pulse 69 Patient declined being weighed. There is no height or weight on file to calculate BMI.    General  - normal appearance, in no obvious distress  CV  - normal radial pulse  Pulm  - normal respiratory pattern, non-labored  Musculoskeletal - right  hand/ wrist  - inspection: normal joint alignment, no obvious deformity, no swelling  - palpation: no bony or soft tissue tenderness except at the IP joint, no tenderness at the anatomical snuffbox  - ROM:  90 deg flexion   70 deg extension   25 deg abduction   65 deg adduction  - strength: 5/5  strength, 5/5 flexion, extension, pronation, supination, adduction, abduction  - special tests:  (-) Tinel's  (-) Finkelstein  (-) Phalen  (-) Linares click test  (-) ulnar impaction     Neuro  - no sensory or motor deficit, grossly normal coordination, normal muscle tone  Skin  - no ecchymosis, erythema, warmth, or induration, no obvious rash  Psych  - interactive, appropriate, normal mood and affect    Upper extremities  -Right thumb: still has some IP joint tenderness, but NO swelling and NO ecchymosis noted in right thumb. Still has some Limited abduction and flexion of distal interphalangeal joint due to pain.  -Significant tremor noted in right arm- unchanged    Back:  Paravertebral muscles normal  Adequate rotation, flexion and extension  Negative straight leg raise and slump test    Lower Extremities:   -Left hip: adequate ROM   -NO ecchymosis and tenderness to palpation over lateral aspects of left extremity and left ankle. No edema noted. improved      ASSESSMENT & PLAN  77 yo male with right thumb pain due to arthritis and sprain improved  Hip and back pain resolved.  -given thumb brace to wear during the day.   -ice and ibuprofen PRN for thumb  F/u as needed  Will consider cortisone injection at that time if not improving      Jair Martel MD, CAM

## 2017-04-10 NOTE — PATIENT INSTRUCTIONS
Thanks for coming today.  Ortho/Sports Medicine Clinic  [address] [place] [address]    To schedule future appointments in Ortho Clinic, you may call [phone].    To schedule ordered imaging by your Provider: Call [facility] at [phone]    [email or web address] available online at:   [email or web address]    Please call if any further questions or concerns [phone] and ask for the Orthopedic Department. Clinic hours 8 am to 5 pm.    Return to clinic if symptoms worsen.

## 2017-04-10 NOTE — PROGRESS NOTES
SUBJECTIVE:                                                    Andrés Hagen is a 76 year old male who presents to clinic today for the following health issues:      Hyperlipidemia Follow-Up      Rate your low fat/cholesterol diet?: good    Taking statin?  Yes, no muscle aches from statin    Other lipid medications/supplements?:  Fish oil/Omega 3, dose without side effects     Hypertension Follow-up      Outpatient blood pressures are not being checked.    Low Salt Diet: not monitoring salt     Chronic Kidney Disease Follow-up      Current NSAID use?  Yes:   ibuprofen (Motrin)  Frequency: used in the last month or so since fall with injuries      Amount of exercise or physical activity: 4-5 days/week for an average of 30-45 minutes    Problems taking medications regularly: Yes,  problems remembering to take (happens about once per week)    Medication side effects: Dry mouth at night    Diet: low fat/cholesterol      ENT Symptoms             Symptoms: cc Present Absent Comment   Fever/Chills   x    Fatigue   x    Muscle Aches   x    Eye Irritation   x    Sneezing   x    Nasal Norris/Drg   x    Sinus Pressure/Pain   x    Loss of smell   x    Dental pain   x    Sore Throat  x  resolving since yesterday   Swollen Glands   x    Ear Pain/Fullness   x    Cough  x  Minimal,gettign better for the past 2 days   Wheeze   x    Chest Pain   x    Shortness of breath   x    Rash       Other         Symptom duration:  2 weeks   Symptom severity:  mild   Treatments tried:  otc cold meds, getting better   Contacts:  none       Vascular Disease Follow-up:  Coronary Artery Disease (CAD)      Chest pain or pressure, left side neck or arm pain: No    Shortness of breath/increased sweats/nausea with exertion: No    Pain in calves walking 1-2 blocks: No    Worsened or new symptoms since last visit: No    Nitroglycerin use: no    Daily aspirin use: Yes       Problem list and histories reviewed & adjusted, as indicated.  Additional  history: as documented    Patient Active Problem List   Diagnosis     Coronary artery disease involving coronary bypass graft of native heart without angina pectoris     Bilateral leg edema     Hyperlipidemia with target LDL less than 100     Obesity (BMI 30-39.9)     CKD (chronic kidney disease) stage 3, GFR 30-59 ml/min     Essential and other specified forms of tremor     Prediabetes     S/P balloon mitral valvuloplasty     Elevated alkaline phosphatase level     Cataract     Benign non-nodular prostatic hyperplasia without lower urinary tract symptoms     Essential hypertension with goal blood pressure less than 130/80     Essential tremor     Mild concentric left ventricular hypertrophy (LVH)     Hepatic lesion     Calculus of gallbladder without cholecystitis without obstruction     Hepatic cyst     Renal cyst     Past Surgical History:   Procedure Laterality Date     CORONARY ARTERY BYPASS       HC PERCUT MITRAL VALVULOPLASTY  2010       Social History   Substance Use Topics     Smoking status: Former Smoker     Types: Cigarettes, Cigars     Smokeless tobacco: Never Used     Alcohol use No     Family History   Problem Relation Age of Onset     HEART DISEASE Mother      Kidney Cancer Mother      HEART DISEASE Father      Kidney Cancer Sister          Current Outpatient Prescriptions   Medication Sig Dispense Refill     atorvastatin (LIPITOR) 80 MG tablet TAKE 1 TABLET EVERY DAY 90 tablet 2     metoprolol (TOPROL-XL) 25 MG 24 hr tablet Take 1/2 tablet daily 45 tablet 2     losartan (COZAAR) 25 MG tablet TAKE 1 TABLET EVERY DAY 90 tablet 2     tamsulosin (FLOMAX) 0.4 MG capsule TAKE 1 CAPSULE EVERY DAY 90 capsule 3     Multiple Vitamin (DAILY MULTIVITAMIN PO) Take 1 tablet by mouth daily       Glucosamine-Chondroit-Vit C-Mn (GLUCOSAMINE-CHONDROITINOITIN) CAPS Take 1 tablet by mouth 2 times daily       Omega-3 Fatty Acids (OMEGA-3 FISH OIL PO) Take 1 capsule by mouth 2 times daily (with meals)       ASPIRIN PO  Take 81 mg by mouth daily       [DISCONTINUED] atorvastatin (LIPITOR) 80 MG tablet TAKE 1 TABLET EVERY DAY 90 tablet 2     [DISCONTINUED] losartan (COZAAR) 25 MG tablet TAKE 1 TABLET EVERY DAY 90 tablet 2     [DISCONTINUED] metoprolol (TOPROL-XL) 25 MG 24 hr tablet Take 1/2 tablet daily 45 tablet 2     No Known Allergies  Recent Labs   Lab Test  10/10/16   0748  12/30/15   1219  11/17/15   0859  10/26/15 04/30/15   A1C  5.8   --    --    --   6.1*  6.0  6.0   LDL  38   --    --    --   60   --    HDL  41   --    --    --   32  30   TRIG  93   --    --    --   101  146   ALT  61   --   60   --    --    --    CR  1.28*  1.23  1.20   --   1.4  1.3   GFRESTIMATED  55*  58*  59*   --    --   58   GFRESTBLACK  66  70  72   < >   --    --    POTASSIUM  4.0  4.1  3.9   --   4.1  3.7   TSH   --    --   1.57   --    --    --     < > = values in this interval not displayed.      BP Readings from Last 3 Encounters:   04/10/17 126/74   04/10/17 128/78   03/20/17 118/75    Wt Readings from Last 3 Encounters:   04/10/17 218 lb 1.6 oz (98.9 kg)   10/10/16 220 lb 8 oz (100 kg)   12/30/15 229 lb 1.6 oz (103.9 kg)                  Labs reviewed in EPIC    Reviewed and updated as needed this visit by clinical staff  Tobacco  Allergies  Meds  Med Hx  Surg Hx  Fam Hx  Soc Hx      Reviewed and updated as needed this visit by Provider         ROS:  C: NEGATIVE for fever, chills, change in weight  INTEGUMENTARY/SKIN: NEGATIVE for worrisome rashes, moles or lesions  EYES: NEGATIVE for vision changes or irritation  ENT/MOUTH: as above  RESP:as above  CV: NEGATIVE for chest pain, palpitations or peripheral edema  CV: h/o CAD and Hx HTN  GI: NEGATIVE for nausea, abdominal pain, heartburn, or change in bowel habits  : negative for dysuria, hematuria, decreased urinary stream, erectile dysfunction  MUSCULOSKELETAL: NEGATIVE for significant arthralgias or myalgia  NEURO: NEGATIVE for weakness, dizziness or paresthesias  Bilateral hand  "tremors, right worse than the left  ENDOCRINE: NEGATIVE for temperature intolerance, skin/hair changes  HEME/ALLERGY/IMMUNE: NEGATIVE for bleeding problems  PSYCHIATRIC: NEGATIVE for changes in mood or affect    OBJECTIVE:                                                    /74  Pulse 75  Temp 97  F (36.1  C) (Oral)  Ht 5' 10\" (1.778 m)  Wt 218 lb 1.6 oz (98.9 kg)  SpO2 96%  BMI 31.29 kg/m2  Body mass index is 31.29 kg/(m^2).  GENERAL: healthy, alert and no distress  EYES: Eyes grossly normal to inspection  HENT: ear canals and TM's normal, nose and mouth without ulcers or lesions  NECK: no adenopathy, no asymmetry, masses, or scars and thyroid normal to palpation  RESP: lungs clear to auscultation - no rales, rhonchi or wheezes  CV: regular rate and rhythm, normal S1 S2, no S3 or S4, no murmur, click or rub, no peripheral edema and peripheral pulses strong  MS: no gross musculoskeletal defects noted, no edema  SKIN: no suspicious lesions or rashes  NEURO: Normal strength and tone, mentation intact and speech normal  NEURO: Normal strength and tone, tremor -both hands, intetnional, sensory exam grossly normal and mentation intact  PSYCH: mentation appears normal, affect normal/bright    Diagnostic Test Results:  none      ASSESSMENT/PLAN:                                                    Hyperlipidemia; controlled   Plan:  No changes in the patient's current treatment plan    Hypertension; controlled   Associated with the following complications:    CKD and Heart Disease   Plan:  No changes in the patient's current treatment plan    Coronary Artery Disease (CAD); controlled   Associated with the following complications:    None   Plan:  No changes in the patient's current treatment plan    Chronic Kidney Disease Stage 3 , stable     Associated with the following complications: None     Plan:  Current treatment plan is appropriate, no change in therapy          BMI:   Estimated body mass index is 31.29 " "kg/(m^2) as calculated from the following:    Height as of this encounter: 5' 10\" (1.778 m).    Weight as of this encounter: 218 lb 1.6 oz (98.9 kg).   Weight management plan: Discussed healthy diet and exercise guidelines and patient will follow up in 6 months in clinic to re-evaluate.      1. Coronary artery disease involving coronary bypass graft of native heart without angina pectoris  Stable, continue metoprolol 12.5 mg daily, continue to control hypertension, lipids, continue baby aspirin, healthy eating ,regular exercises,, recheck in 6 months at the time of physical  - metoprolol (TOPROL-XL) 25 MG 24 hr tablet; Take 1/2 tablet daily  Dispense: 45 tablet; Refill: 2    2. Essential hypertension with goal blood pressure less than 130/80  Blood pressure is at goal, will continue with metoprolol 20.5 mg daily, losartan 25 mg daily  Will follow low salt diet, weight loss and regular exercises.   recheck in 6 months  - Basic metabolic panel  (Ca, Cl, CO2, Creat, Gluc, K, Na, BUN)  - metoprolol (TOPROL-XL) 25 MG 24 hr tablet; Take 1/2 tablet daily  Dispense: 45 tablet; Refill: 2  - losartan (COZAAR) 25 MG tablet; TAKE 1 TABLET EVERY DAY  Dispense: 90 tablet; Refill: 2    3. CKD (chronic kidney disease) stage 3, GFR 30-59 ml/min  Patient has been using over-the-counter ibuprofen for his right thumb pain intermittently in the last month  Will f/u on results and call with recommendations.  Recommended to refrain from using over-the-counter NSAIDs including Advil, Aleve.   Will take Tylenol arthritis 650 mg 2-4 times a day as needed for pain  - Basic metabolic panel  (Ca, Cl, CO2, Creat, Gluc, K, Na, BUN)  - losartan (COZAAR) 25 MG tablet; TAKE 1 TABLET EVERY DAY  Dispense: 90 tablet; Refill: 2    4. Hyperlipidemia with target LDL less than 100  LDL Cholesterol Calculated   Date Value Ref Range Status   10/10/2016 38 <100 mg/dL Final     Comment:     Desirable:       <100 mg/dl   ]  Continue with Lipitor 80 mg daily  - " atorvastatin (LIPITOR) 80 MG tablet; TAKE 1 TABLET EVERY DAY  Dispense: 90 tablet; Refill: 2    5. Encounter for abdominal aortic aneurysm screening    - US abdominal aorta limited; Future    6. Tremor  Never had a neurology evaluation before.   With recent worsening of right hand tremor, recommended to get a consult from neurology  - NEUROLOGY ADULT REFERRAL    7. Upper respiratory tract infection, unspecified type  Likely viral,   resolving per patient.   No abnormal clinical findings today  Continue to monitor      Chart documentation done in part with Dragon Voice recognition Software. Although reviewed after completion, some word and grammatical error may remain.    See Patient Instructions    Juventino Glass MD  [facility]

## 2017-04-10 NOTE — PATIENT INSTRUCTIONS
Get the labs today    Do not take MOTRIN, ADVIL or ALEVE, instead take tylenol arthritis 650 mg 3-4 times a day for pain as needed    Schedule for ultrasound for AAA screening  Schedule for physical and fasting labs in 6 months  Schedule for neurology consult

## 2017-04-10 NOTE — MR AVS SNAPSHOT
After Visit Summary   4/10/2017    Andrés Hagen    MRN: [id number]           Patient Information     Date Of Birth          1940        Visit Information        Provider Department      4/10/2017 8:40 AM Juventino Glass MD [facility]        Today's Diagnoses     Encounter for abdominal aortic aneurysm screening    -  1    Coronary artery disease involving coronary bypass graft of native heart without angina pectoris        Essential hypertension with goal blood pressure less than 130/80        CKD (chronic kidney disease) stage 3, GFR 30-59 ml/min        Hyperlipidemia with target LDL less than 100        Tremor        Upper respiratory tract infection, unspecified type          Care Instructions    Get the labs today    Do not take MOTRIN, ADVIL or ALEVE, instead take tylenol arthritis 650 mg 3-4 times a day for pain as needed    Schedule for ultrasound for AAA screening  Schedule for physical and fasting labs in 6 months  Schedule for neurology consult          Follow-ups after your visit        Additional Services     NEUROLOGY ADULT REFERRAL       Your provider has referred you to: [facility]: [facility] [phone]   [email or web address]    Reason for Referral: Consult    Please be aware that coverage of these services is subject to the terms and limitations of your health insurance plan.  Call member services at your health plan with any benefit or coverage questions.      Please bring the following with you to your appointment:    (1) Any X-Rays, CTs or MRIs which have been performed.  Contact the facility where they were done to arrange for  prior to your scheduled appointment.    (2) List of current medications  (3) This referral request   (4) Any documents/labs given to you for this referral                  Future tests that were ordered for you today     Open Future Orders      "   Priority Expected Expires Ordered    US abdominal aorta limited Routine  4/10/2018 4/10/2017            Who to contact     If you have questions or need follow up information about today's clinic visit or your schedule please contact [facility] directly at [phone].  Normal or non-critical lab and imaging results will be communicated to you by MyChart, letter or phone within 4 business days after the clinic has received the results. If you do not hear from us within 7 days, please contact the clinic through MyChart or phone. If you have a critical or abnormal lab result, we will notify you by phone as soon as possible.  Submit refill requests through [employer] or call your pharmacy and they will forward the refill request to us. Please allow 3 business days for your refill to be completed.          Additional Information About Your Visit        [employer] Information     [employer]ili is an electronic gateway that provides easy, online access to your medical records. With Joobili, you can request a clinic appointment, read your test results, renew a prescription or communicate with your care team.     To sign up for Jo[employer]ili visit the website at [email or web address]   You will be asked to enter the access code listed below, as well as some personal information. Please follow the directions to create your username and password.     Your access code is: [email or web address]  Expires: 2017  6:12 PM     Your access code will  in 90 days. If you need help or a new code, please contact your [facility] or call [phone] for assistance.        Care EveryWhere ID     This is your Care EveryWhere ID. This could be used by other organizations to access your [facility] medical records  [id number]        Your Vitals Were     Pulse Temperature Height Pulse Oximetry BMI (Body Mass Index)       75 97  F (36.1  C) (Oral) 5' 10\" (1.778 m) 96% 31.29 kg/m2        Blood Pressure " from Last 3 Encounters:   04/10/17 126/74   04/10/17 128/78   03/20/17 118/75    Weight from Last 3 Encounters:   04/10/17 218 lb 1.6 oz (98.9 kg)   10/10/16 220 lb 8 oz (100 kg)   12/30/15 229 lb 1.6 oz (103.9 kg)              We Performed the Following     Basic metabolic panel  (Ca, Cl, CO2, Creat, Gluc, K, Na, BUN)     NEUROLOGY ADULT REFERRAL          Where to get your medicines      These medications were sent to [facility] - [place], [place] - [address]  [address], [facility] [address]     Phone:  [phone]     atorvastatin 80 MG tablet    losartan 25 MG tablet    metoprolol 25 MG 24 hr tablet          Primary Care Provider Office Phone # Fax #    Juventino Glass [phone]       [facility] [address] [facility] [address]        Thank you!     Thank you for choosing [facility]  for your care. Our goal is always to provide you with excellent care. Hearing back from our patients is one way we can continue to improve our services. Please take a few minutes to complete the written survey that you may receive in the mail after your visit with us. Thank you!             Your Updated Medication List - Protect others around you: Learn how to safely use, store and throw away your medicines at www.disposemymeds.org.          This list is accurate as of: 4/10/17  9:01 AM.  Always use your most recent med list.                   Brand Name Dispense Instructions for use    ASPIRIN PO      Take 81 mg by mouth daily       atorvastatin 80 MG tablet    LIPITOR    90 tablet    TAKE 1 TABLET EVERY DAY       DAILY MULTIVITAMIN PO      Take 1 tablet by mouth daily       glucosamine-chondroitinoitin Caps      Take 1 tablet by mouth 2 times daily       losartan 25 MG tablet    COZAAR    90 tablet    TAKE 1 TABLET EVERY DAY       metoprolol 25 MG 24 hr tablet    TOPROL-XL    45 tablet    Take 1/2 tablet daily       OMEGA-3 FISH OIL PO       Take 1 capsule by mouth 2 times daily (with meals)       tamsulosin 0.4 MG capsule    FLOMAX    90 capsule    TAKE 1 CAPSULE EVERY DAY

## 2017-04-10 NOTE — NURSING NOTE
"Chief Complaint   Patient presents with     Recheck Medication       Initial /74  Pulse 75  Temp 97  F (36.1  C) (Oral)  Ht 5' 10\" (1.778 m)  Wt 218 lb 1.6 oz (98.9 kg)  SpO2 96%  BMI 31.29 kg/m2 Estimated body mass index is 31.29 kg/(m^2) as calculated from the following:    Height as of this encounter: 5' 10\" (1.778 m).    Weight as of this encounter: 218 lb 1.6 oz (98.9 kg).  BP completed using cuff size: satinder Chance, CMA    "

## 2017-04-11 ENCOUNTER — RADIANT APPOINTMENT (OUTPATIENT)
Dept: ULTRASOUND IMAGING | Facility: CLINIC | Age: 77
End: 2017-04-11
Attending: FAMILY MEDICINE
Payer: COMMERCIAL

## 2017-04-11 DIAGNOSIS — N18.30 CKD (CHRONIC KIDNEY DISEASE) STAGE 3, GFR 30-59 ML/MIN (H): ICD-10-CM

## 2017-04-11 DIAGNOSIS — Z13.6 ENCOUNTER FOR ABDOMINAL AORTIC ANEURYSM SCREENING: ICD-10-CM

## 2017-04-11 DIAGNOSIS — I10 ESSENTIAL HYPERTENSION WITH GOAL BLOOD PRESSURE LESS THAN 130/80: ICD-10-CM

## 2017-04-11 LAB
ANION GAP SERPL CALCULATED.3IONS-SCNC: 5 MMOL/L (ref 3–14)
BUN SERPL-MCNC: 25 MG/DL (ref 7–30)
CALCIUM SERPL-MCNC: 9.1 MG/DL (ref 8.5–10.1)
CHLORIDE SERPL-SCNC: 109 MMOL/L (ref 94–109)
CO2 SERPL-SCNC: 28 MMOL/L (ref 20–32)
CREAT SERPL-MCNC: 1.26 MG/DL (ref 0.66–1.25)
GFR SERPL CREATININE-BSD FRML MDRD: 56 ML/MIN/1.7M2
GLUCOSE SERPL-MCNC: 108 MG/DL (ref 70–99)
POTASSIUM SERPL-SCNC: 4.1 MMOL/L (ref 3.4–5.3)
SODIUM SERPL-SCNC: 142 MMOL/L (ref 133–144)

## 2017-04-11 PROCEDURE — 36415 COLL VENOUS BLD VENIPUNCTURE: CPT | Performed by: FAMILY MEDICINE

## 2017-04-11 PROCEDURE — 80048 BASIC METABOLIC PNL TOTAL CA: CPT | Performed by: FAMILY MEDICINE

## 2017-04-11 PROCEDURE — 76706 US ABDL AORTA SCREEN AAA: CPT | Performed by: RADIOLOGY

## 2017-05-09 ENCOUNTER — TELEPHONE (OUTPATIENT)
Dept: PEDIATRICS | Facility: CLINIC | Age: 77
End: 2017-05-09

## 2017-05-09 NOTE — TELEPHONE ENCOUNTER
Called patient back.  He states he received a call from someone from this clinic and to call back.   Let patient know that there is no notes in his chart of a call being made to patient.      No further questions at this time.    Linda Rosales RN, [facility]

## 2017-05-09 NOTE — TELEPHONE ENCOUNTER
[facility] Call Center    Phone Message    Name of Caller: Andrés    Phone Number: Cell number on file:    Telephone Information:   Mobile [phone]       Best time to return call: ANY    May a detailed message be left on voicemail: yes    Relation to patient: Self    Reason for Call: Other: Patient is calling back Dr. Mejias he states.     Action Taken: Message routed to:  Primary Care p [phone]

## 2017-05-30 ENCOUNTER — PRE VISIT (OUTPATIENT)
Dept: NEUROLOGY | Facility: CLINIC | Age: 77
End: 2017-05-30

## 2017-05-30 NOTE — TELEPHONE ENCOUNTER
[facility] CLINICAL DOCUMENTATION    Pre-Visit Planning   PREVISIT INFORMATION                                                    Andrés Hagen scheduled for future visit at [facility] specialty clinics.    Patient is scheduled to see Mikael (provider) on 5/31/17 (date)  Reason for visit: tremor  Referring provider Quan  Has patient seen previous specialist? No  Medical Records:  Available in chart.  Patient was previously seen at a [facility] or [facility] facility.    REVIEW                                                      New patient packet mailed to patient: Yes  Medication reconciliation complete: No      Current Outpatient Prescriptions   Medication Sig Dispense Refill     atorvastatin (LIPITOR) 80 MG tablet TAKE 1 TABLET EVERY DAY 90 tablet 2     metoprolol (TOPROL-XL) 25 MG 24 hr tablet Take 1/2 tablet daily 45 tablet 2     losartan (COZAAR) 25 MG tablet TAKE 1 TABLET EVERY DAY 90 tablet 2     tamsulosin (FLOMAX) 0.4 MG capsule TAKE 1 CAPSULE EVERY DAY 90 capsule 3     Multiple Vitamin (DAILY MULTIVITAMIN PO) Take 1 tablet by mouth daily       Glucosamine-Chondroit-Vit C-Mn (GLUCOSAMINE-CHONDROITINOITIN) CAPS Take 1 tablet by mouth 2 times daily       Omega-3 Fatty Acids (OMEGA-3 FISH OIL PO) Take 1 capsule by mouth 2 times daily (with meals)       ASPIRIN PO Take 81 mg by mouth daily         Allergies: Review of patient's allergies indicates no known allergies.    (insert provider dot-phrase for provider specific visit requirements)    PLAN/FOLLOW-UP NEEDED                                                      Previsit review complete.  Patient will see provider at future scheduled appointment.     Patient Reminders Given:  Please, make sure you bring an updated list of your medications.   If you are having a procedure, please, present 15 minutes early.  If you need to cancel or reschedule,please call [phone].    Darla Severin-Brown

## 2017-05-31 ENCOUNTER — OFFICE VISIT (OUTPATIENT)
Dept: NEUROLOGY | Facility: CLINIC | Age: 77
End: 2017-05-31
Attending: FAMILY MEDICINE
Payer: COMMERCIAL

## 2017-05-31 VITALS
HEIGHT: 71 IN | BODY MASS INDEX: 31.36 KG/M2 | DIASTOLIC BLOOD PRESSURE: 82 MMHG | OXYGEN SATURATION: 97 % | HEART RATE: 66 BPM | WEIGHT: 224 LBS | SYSTOLIC BLOOD PRESSURE: 114 MMHG

## 2017-05-31 DIAGNOSIS — G25.0 ESSENTIAL TREMOR: Primary | ICD-10-CM

## 2017-05-31 LAB
ERYTHROCYTE [DISTWIDTH] IN BLOOD BY AUTOMATED COUNT: 15.3 % (ref 10–15)
HCT VFR BLD AUTO: 46.9 % (ref 40–53)
HGB BLD-MCNC: 15.8 G/DL (ref 13.3–17.7)
MCH RBC QN AUTO: 31.7 PG (ref 26.5–33)
MCHC RBC AUTO-ENTMCNC: 33.7 G/DL (ref 31.5–36.5)
MCV RBC AUTO: 94 FL (ref 78–100)
PLATELET # BLD AUTO: 139 10E9/L (ref 150–450)
RBC # BLD AUTO: 4.99 10E12/L (ref 4.4–5.9)
TSH SERPL DL<=0.005 MIU/L-ACNC: 1 MU/L (ref 0.4–4)
WBC # BLD AUTO: 6.3 10E9/L (ref 4–11)

## 2017-05-31 PROCEDURE — 36415 COLL VENOUS BLD VENIPUNCTURE: CPT | Performed by: PSYCHIATRY & NEUROLOGY

## 2017-05-31 PROCEDURE — 85027 COMPLETE CBC AUTOMATED: CPT | Performed by: PSYCHIATRY & NEUROLOGY

## 2017-05-31 PROCEDURE — 99203 OFFICE O/P NEW LOW 30 MIN: CPT | Performed by: PSYCHIATRY & NEUROLOGY

## 2017-05-31 PROCEDURE — 84443 ASSAY THYROID STIM HORMONE: CPT | Performed by: PSYCHIATRY & NEUROLOGY

## 2017-05-31 RX ORDER — GABAPENTIN 100 MG/1
CAPSULE ORAL
Qty: 180 CAPSULE | Refills: 1 | Status: SHIPPED | OUTPATIENT
Start: 2017-05-31 | End: 2017-07-19

## 2017-05-31 ASSESSMENT — PAIN SCALES - GENERAL: PAINLEVEL: NO PAIN (0)

## 2017-05-31 NOTE — MR AVS SNAPSHOT
After Visit Summary   5/31/2017    Andrés Hagen    MRN: [id number]           Patient Information     Date Of Birth          1940        Visit Information        Provider Department      5/31/2017 8:30 AM Brandon Youssef MD [facility]        Today's Diagnoses     Essential tremor    -  1       Follow-ups after your visit        Follow-up notes from your care team     Discussed this visit Return in about 1 month (around 6/30/2017).      Your next 10 appointments already scheduled     Oct 17, 2017  7:10 AM CDT   LAB with LAB FIRST FLOOR formerly [facility] ([facility])    [address] [place] [id number] [phone]           Patient must bring picture ID.  Patient should be prepared to give a urine specimen  Please do not eat 10-12 hours before your appointment if you are coming in fasting for labs on lipids, cholesterol, or glucose (sugar).  Pregnant women should follow their Care Team instructions. Water with medications is okay. Do not drink coffee or other fluids.   If you have concerns about taking  your medications, please ask at office or if scheduling via "[employer] send a message by clicking on Secure Messaging, Message Your Care Team.            Oct 17, 2017  7:40 AM CDT   PHYSICAL with Juventino Glass MD   [facility] ([facility]    [address] [place] [id number] [phone]              Future tests that were ordered for you today     Open Future Orders        Priority Expected Expires Ordered    CBC with platelets Routine 6/5/2017 5/31/2018 5/31/2017            Who to contact     If you have questions or need follow up information about today's clinic visit or your schedule please contact [facility] directly at [phone].  Normal or non-critical lab and imaging results will be communicated to you by MyChart, letter or phone within 4  "business days after the clinic has received the results. If you do not hear from us within 7 days, please contact the clinic through [employer] or phone. If you have a critical or abnormal lab result, we will notify you by phone as soon as possible.  Submit refill requests through [employer] or call your pharmacy and they will forward the refill request to us. Please allow 3 business days for your refill to be completed.          Additional Information About Your Visit        [employer] Cart[employer] Information     [employer] is an electronic gateway that provides easy, online access to your medical records. With [employer], you can request a clinic appointment, read your test results, renew a prescription or communicate with your care team.     To sign up for [employer] visit the website at [email or web address]   You will be asked to enter the access code listed below, as well as some personal information. Please follow the directions to create your username and password.     Your access code is: [email or web address]  Expires: 2017  6:12 PM     Your access code will  in 90 days. If you need help or a new code, please contact your [facility] or call [phone] for assistance.        Care EveryWhere ID     This is your Care EveryWhere ID. This could be used by other organizations to access your [facility] medical records  [id number]        Your Vitals Were     Pulse Height Pulse Oximetry BMI (Body Mass Index)          66 1.803 m (5' 11\") 97% 31.24 kg/m2         Blood Pressure from Last 3 Encounters:   17 114/82   04/10/17 126/74   04/10/17 128/78    Weight from Last 3 Encounters:   17 101.6 kg (224 lb)   04/10/17 98.9 kg (218 lb 1.6 oz)   10/10/16 100 kg (220 lb 8 oz)              We Performed the Following     TSH with free T4 reflex          Today's Medication Changes          These changes are accurate as of: 17  9:01 AM.  If you have any questions, ask your nurse or doctor.          "      Start taking these medicines.        Dose/Directions    gabapentin 100 MG capsule   Commonly known as:  NEURONTIN   Used for:  Essential tremor   Started by:  Brandon Youssef MD        1 Capsule 3 times a day. May increase to 2 capsules 3 times a day after 1 week   Quantity:  180 capsule   Refills:  1            Where to get your medicines      These medications were sent to [id number]  [place], [place] - [address] [facility]  [address] [facility] [place] [address]     Phone:  [phone]     gabapentin 100 MG capsule                Primary Care Provider Office Phone # Fax #    Juventino Glass [phone]       [facility] [id number]  [facility] [address]        Thank you!     Thank you for choosing [facility]  for your care. Our goal is always to provide you with excellent care. Hearing back from our patients is one way we can continue to improve our services. Please take a few minutes to complete the written survey that you may receive in the mail after your visit with us. Thank you!             Your Updated Medication List - Protect others around you: Learn how to safely use, store and throw away your medicines at www.disposemymeds.org.          This list is accurate as of: 5/31/17  9:01 AM.  Always use your most recent med list.                   Brand Name Dispense Instructions for use    ASPIRIN PO      Take 81 mg by mouth daily       atorvastatin 80 MG tablet    LIPITOR    90 tablet    TAKE 1 TABLET EVERY DAY       DAILY MULTIVITAMIN PO      Take 1 tablet by mouth daily       gabapentin 100 MG capsule    NEURONTIN    180 capsule    1 Capsule 3 times a day. May increase to 2 capsules 3 times a day after 1 week       glucosamine-chondroitinoitin Caps      Take 1 tablet by mouth 2 times daily       losartan 25 MG tablet    COZAAR    90 tablet    TAKE 1 TABLET EVERY DAY       metoprolol 25 MG 24 hr tablet    TOPROL-XL    45 tablet     Take 1/2 tablet daily       OMEGA-3 FISH OIL PO      Take 1 capsule by mouth 2 times daily (with meals)       tamsulosin 0.4 MG capsule    FLOMAX    90 capsule    TAKE 1 CAPSULE EVERY DAY

## 2017-05-31 NOTE — NURSING NOTE
"Andrés Hagen's goals for this visit include: return  He requests these members of his care team be copied on today's visit information:     PCP: Juventino Glass    Referring Provider:  Juventino Glass MD  [facility]  [address] [place] [address]    Chief Complaint   Patient presents with     Consult       Initial /82  Pulse 66  Ht 1.803 m (5' 11\")  Wt 101.6 kg (224 lb)  SpO2 97%  BMI 31.24 kg/m2 Estimated body mass index is 31.24 kg/(m^2) as calculated from the following:    Height as of this encounter: 1.803 m (5' 11\").    Weight as of this encounter: 101.6 kg (224 lb).  Medication Reconciliation: complete    Do you need any medication refills at today's visit? n  "

## 2017-05-31 NOTE — LETTER
"  5/31/2017      RE: Andrés Hagen  [address] [place] [address]     Dear Colleague,    Thank you for referring your patient, Andrés Hagen, to the [facility]. Please see a copy of my visit note below.    HISTORY OF PRESENT ILLNESS:  Andrés Hagen is a 76-year-old male here for evaluation of tremor.      He has appreciated the tremor his entire life.  He tells me he had rheumatic fever as a child and developed the tremor subsequent to that.  Over the years, it has gradually gotten worse and worse and this has been particularly so over the past couple of years.      The tremor affects his hands.  He indicates it is the right hand more than the left.  It makes it difficult for him to do things like eating and writing.  He describes mainly a postural and action tremor, not a rest tremor.  He has not appreciated a voice tremor, head tremor or tremor of his legs.  He has had no trouble with gait or balance.      He has not used alcohol for many years and he cannot recall if alcohol had a positive impact on his tremor.  He is on metoprolol at a low dose (12.5 mg).  He recalls he had trouble tolerating higher doses of this beta blocker due to side effects, but he cannot recall what the side effects were.      His father and paternal grandfather were \"shaky.\"      Laboratory work included a basic metabolic panel done last month.  He has some mild renal insufficiency with a creatinine of 1.26 and a GFR of 56.  A comprehensive metabolic panel performed in 10/2016 was notable for normal ALT and AST.  CBCs over the past year and half have been essentially normal aside from a mild thrombocytopenia with a platelet count of 136,000 in 10/2016.  His thyroid functions were checked a couple of years ago and his TSH was normal.      PAST MEDICAL HISTORY:  Notable for coronary artery disease, hyperlipidemia, chronic kidney disease.  He also tells me he has a history of kidney stones and at one point was " told he had glaucoma, but is on no treatment for glaucoma currently.      CURRENT MEDICATIONS:   1.  Atorvastatin.   2.  Metoprolol 12.5 mg a day.   3.  Losartan.   4.  Flomax.   5.  Multivitamin.   6.  Fish oil.   7.  Aspirin 81 mg.      ALLERGIES:  He has no medical allergies.      FAMILY HISTORY:  Notable for his father and paternal grandfather possibly having tremor.      SOCIAL HISTORY:  He is retired.  He had his own construction business.  He does not smoke and does not use alcohol.      PHYSICAL EXAMINATION:   GENERAL:  Reveals patient is alert and cooperative.   VITAL SIGNS:  Heart rate 66.  Blood pressure 114/82.   NEUROLOGIC:  He has bilateral hearing aids.  There is no head or voice tremor appreciated.  He has normal facial expression.  Extraocular motility is full, without nystagmus.  Aside from the bilateral hearing impairment, cranial nerves II-XII were intact.  Motor and sensory are normal.  His muscle tone is normal.  The patient has a moderate to severe postural and action tremor affecting both upper extremities, although it is slightly more pronounced on the right than on the left.  His handwriting is quite tremulous and almost illegible.  His spiral is very tremulous.  Gait is unremarkable.  He is somewhat stooped at the waist, but this likely relates to his low back.  Reflexes are 2+ except for the right ankle jerk which is absent.  Plantar responses are flexor.      IMPRESSION:  Moderately severe essential tremor.  Possibly familial tremor.      PLAN:  I discussed various treatment options.  He recalls he had difficulty tolerating metoprolol at a dose higher than the current 12.5 mg, so I think utilizing a beta blocker would likely be problematic.      He reports a history of kidney stones and possibly glaucoma, so Topamax would be relatively contraindicated.      I discussed gabapentin and primidone.  He has some mild renal insufficiency, but gabapentin could be tried at a low dose.  Primidone  could also be an option, but I want to make certain he has not developed a more significant thrombocytopenia since his blood work was last checked.      He is going to start on gabapentin 100 mg 3 times a day with the latitude after a week to double the dose to 200 mg 3 times a day.  He is concerned about the possibility of sedation and I told him he could stop the drug if he develops any significant side effects.      I am going to check a CBC with platelet count and also check his thyroid functions today.      I am going to be seeing him back in a few weeks.         VEE BRICEÑO MD             D: 2017 09:14   T: 2017 11:34   MT: TS      Name:     KALEE MOORE   MRN:      [id number]        Account:      [id number]   :      1940           Visit Date:   2017      Document: [id number]       cc: Juventino Glass MD      Again, thank you for allowing me to participate in the care of your patient.      Sincerely,    Vee Briceño MD

## 2017-05-31 NOTE — PROGRESS NOTES
"HISTORY OF PRESENT ILLNESS:  Andrés Hagen is a 76-year-old male here for evaluation of tremor.      He has appreciated the tremor his entire life.  He tells me he had rheumatic fever as a child and developed the tremor subsequent to that.  Over the years, it has gradually gotten worse and worse and this has been particularly so over the past couple of years.      The tremor affects his hands.  He indicates it is the right hand more than the left.  It makes it difficult for him to do things like eating and writing.  He describes mainly a postural and action tremor, not a rest tremor.  He has not appreciated a voice tremor, head tremor or tremor of his legs.  He has had no trouble with gait or balance.      He has not used alcohol for many years and he cannot recall if alcohol had a positive impact on his tremor.  He is on metoprolol at a low dose (12.5 mg).  He recalls he had trouble tolerating higher doses of this beta blocker due to side effects, but he cannot recall what the side effects were.      His father and paternal grandfather were \"shaky.\"      Laboratory work included a basic metabolic panel done last month.  He has some mild renal insufficiency with a creatinine of 1.26 and a GFR of 56.  A comprehensive metabolic panel performed in 10/2016 was notable for normal ALT and AST.  CBCs over the past year and half have been essentially normal aside from a mild thrombocytopenia with a platelet count of 136,000 in 10/2016.  His thyroid functions were checked a couple of years ago and his TSH was normal.      PAST MEDICAL HISTORY:  Notable for coronary artery disease, hyperlipidemia, chronic kidney disease.  He also tells me he has a history of kidney stones and at one point was told he had glaucoma, but is on no treatment for glaucoma currently.      CURRENT MEDICATIONS:   1.  Atorvastatin.   2.  Metoprolol 12.5 mg a day.   3.  Losartan.   4.  Flomax.   5.  Multivitamin.   6.  Fish oil.   7.  Aspirin 81 mg.    "   ALLERGIES:  He has no medical allergies.      FAMILY HISTORY:  Notable for his father and paternal grandfather possibly having tremor.      SOCIAL HISTORY:  He is retired.  He had his own construction business.  He does not smoke and does not use alcohol.      PHYSICAL EXAMINATION:   GENERAL:  Reveals patient is alert and cooperative.   VITAL SIGNS:  Heart rate 66.  Blood pressure 114/82.   NEUROLOGIC:  He has bilateral hearing aids.  There is no head or voice tremor appreciated.  He has normal facial expression.  Extraocular motility is full, without nystagmus.  Aside from the bilateral hearing impairment, cranial nerves II-XII were intact.  Motor and sensory are normal.  His muscle tone is normal.  The patient has a moderate to severe postural and action tremor affecting both upper extremities, although it is slightly more pronounced on the right than on the left.  His handwriting is quite tremulous and almost illegible.  His spiral is very tremulous.  Gait is unremarkable.  He is somewhat stooped at the waist, but this likely relates to his low back.  Reflexes are 2+ except for the right ankle jerk which is absent.  Plantar responses are flexor.      IMPRESSION:  Moderately severe essential tremor.  Possibly familial tremor.      PLAN:  I discussed various treatment options.  He recalls he had difficulty tolerating metoprolol at a dose higher than the current 12.5 mg, so I think utilizing a beta blocker would likely be problematic.      He reports a history of kidney stones and possibly glaucoma, so Topamax would be relatively contraindicated.      I discussed gabapentin and primidone.  He has some mild renal insufficiency, but gabapentin could be tried at a low dose.  Primidone could also be an option, but I want to make certain he has not developed a more significant thrombocytopenia since his blood work was last checked.      He is going to start on gabapentin 100 mg 3 times a day with the latitude after a  week to double the dose to 200 mg 3 times a day.  He is concerned about the possibility of sedation and I told him he could stop the drug if he develops any significant side effects.      I am going to check a CBC with platelet count and also check his thyroid functions today. (ADDENDUM: TSH nl, Plats stable @ 139k. Called patient)     I am going to be seeing him back in a few weeks.         VEE BRICEÑO MD             D: 2017 09:14   T: 2017 11:34   MT: MAC      Name:     KALEE MOORE   MRN:      [id number]        Account:      [id number]   :      1940           Visit Date:   2017      Document: [id number]       cc: Juventino Glass MD

## 2017-07-19 ENCOUNTER — OFFICE VISIT (OUTPATIENT)
Dept: NEUROLOGY | Facility: CLINIC | Age: 77
End: 2017-07-19
Payer: COMMERCIAL

## 2017-07-19 VITALS
TEMPERATURE: 97 F | DIASTOLIC BLOOD PRESSURE: 67 MMHG | WEIGHT: 224.4 LBS | BODY MASS INDEX: 31.3 KG/M2 | HEART RATE: 74 BPM | SYSTOLIC BLOOD PRESSURE: 103 MMHG | OXYGEN SATURATION: 94 %

## 2017-07-19 DIAGNOSIS — G25.0 ESSENTIAL TREMOR: ICD-10-CM

## 2017-07-19 PROCEDURE — 99213 OFFICE O/P EST LOW 20 MIN: CPT | Performed by: PSYCHIATRY & NEUROLOGY

## 2017-07-19 RX ORDER — GABAPENTIN 100 MG/1
CAPSULE ORAL
Qty: 810 CAPSULE | Refills: 1 | Status: SHIPPED | OUTPATIENT
Start: 2017-07-19

## 2017-07-19 ASSESSMENT — PAIN SCALES - GENERAL: PAINLEVEL: NO PAIN (0)

## 2017-07-19 NOTE — PROGRESS NOTES
This is a follow-up visit for essential tremor. 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    He has mild renal insufficiency    Patient has a history of kidney stones and possibly glaucoma    Since initially seen he did have a TSH checked that was normal. He does have a history of mild thrombocytopenia. His platelet count was 139,000.    He did start on gabapentin. He is currently taking 200 mg of gabapentin 3 times a day. He is tolerating the drug well. It has had a positive impact. He tells me his writing is now legible. He can eat vegetables with a fork whereas he had required a spoon to get the vegetables to his mouth.    Examination reveals heart rate is 76. Blood pressure 103/67  Today he has  a moderate postural and action tremor of the right upper extremity. He has a less severe postural and action tremor of the left upper extremity. His handwriting is tremulous but it is legible today. His spiral is quite tremulous.      Impression: Essential tremor. Possible familial tremor.    Plan: He is going to continue with the gabapentin and he now has the latitude to gradually increase to 300 mg 3 times a day depending on his tolerance. He does tell me he takes his last dose of gabapentin at bedtime and I suggested for better efficacy he spread the dosing out during the daytime.  The patient may be relocating to [place] and I suggested he find neurologic provider there. Perhaps the [place] clinic. If he still and in this area in 2 months I will see him back.    Brandon Youssef MD

## 2017-07-19 NOTE — NURSING NOTE
"Andrés Hagen's goals for this visit include: return  He requests these members of his care team be copied on today's visit information:     PCP: Juventino Glass    Referring Provider:  No referring provider defined for this encounter.    Chief Complaint   Patient presents with     RECHECK       Initial /67  Pulse 74  Temp 97  F (36.1  C) (Oral)  Wt 101.8 kg (224 lb 6.4 oz)  SpO2 94%  BMI 31.3 kg/m2 Estimated body mass index is 31.3 kg/(m^2) as calculated from the following:    Height as of 5/31/17: 1.803 m (5' 11\").    Weight as of this encounter: 101.8 kg (224 lb 6.4 oz).  Medication Reconciliation: complete    Do you need any medication refills at today's visit? ?  "

## 2017-07-19 NOTE — LETTER
7/19/2017      RE: Andrés Hagen  [address] [place] [address]     Dear Colleague,    Thank you for referring your patient, Andrés Hagen, to the [facility]. Please see a copy of my visit note below.    This is a follow-up visit for essential tremor. 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    He has mild renal insufficiency    Patient has a history of kidney stones and possibly glaucoma    Since initially seen he did have a TSH checked that was normal. He does have a history of mild thrombocytopenia. His platelet count was 139,000.    He did start on gabapentin. He is currently taking 200 mg of gabapentin 3 times a day. He is tolerating the drug well. It has had a positive impact. He tells me his writing is now legible. He can eat vegetables with a fork whereas he had required a spoon to get the vegetables to his mouth.    Examination reveals heart rate is 76. Blood pressure 103/67  Today he has  a moderate postural and action tremor of the right upper extremity. He has a less severe postural and action tremor of the left upper extremity. His handwriting is tremulous but it is legible today. His spiral is quite tremulous.      Impression: Essential tremor. Possible familial tremor.    Plan: He is going to continue with the gabapentin and he now has the latitude to gradually increase to 300 mg 3 times a day depending on his tolerance. He does tell me he takes his last dose of gabapentin at bedtime and I suggested for better efficacy he spread the dosing out during the daytime.  The patient may be relocating to [place] and I suggested he find neurologic provider there. Perhaps the [place] clinic. If he still and in this area in 2 months I will see him back.    Brandon Youssef MD    Again, thank you for allowing me to participate in the care of your patient.      Sincerely,    Brandon Youssef MD

## 2017-07-19 NOTE — MR AVS SNAPSHOT
After Visit Summary   7/19/2017    Andrés Hagen    MRN: [id number]           Patient Information     Date Of Birth          1940        Visit Information        Provider Department      7/19/2017 9:30 AM Brandon Youssef MD [facility]        Today's Diagnoses     Essential tremor           Follow-ups after your visit        Follow-up notes from your care team     Return in about 2 months (around 9/19/2017).      Your next 10 appointments already scheduled     Sep 20, 2017  9:00 AM CDT   Return Visit with Brandon Youssef MD   [facility] ([facility])    [address] [place] [id number] [phone]            Oct 17, 2017  7:10 AM CDT   LAB with LAB FIRST FLOOR [facility])    [address] [place] [id number] [phone]           Patient must bring picture ID.  Patient should be prepared to give a urine specimen  Please do not eat 10-12 hours before your appointment if you are coming in fasting for labs on lipids, cholesterol, or glucose (sugar).  Pregnant women should follow their Care Team instructions. Water with medications is okay. Do not drink coffee or other fluids.   If you have concerns about taking  your medications, please ask at office or if scheduling via [employer] send a message by clicking on Secure Messaging, Message Your Care Team.            Oct 17, 2017  7:30 AM CDT   PHYSICAL with Juventino Glass MD   [facility])    [address] [place] [id number] [phone]              Who to contact     If you have questions or need follow up information about today's clinic visit or your schedule please contact [facility] directly at [phone].  Normal or non-critical lab and imaging results will be communicated to you by MyChart, letter or phone within 4  business days after the clinic has received the results. If you do not hear from us within 7 days, please contact the clinic through [employer] or phone. If you have a critical or abnormal lab result, we will notify you by phone as soon as possible.  Submit refill requests through [employer] or call your pharmacy and they will forward the refill request to us. Please allow 3 business days for your refill to be completed.          Additional Information About Your Visit        [employer] Information     [employer] is an electronic gateway that provides easy, online access to your medical records. With [employer], you can request a clinic appointment, read your test results, renew a prescription or communicate with your care team.     To sign up for [employer] visit the website at [email or web address]   You will be asked to enter the access code listed below, as well as some personal information. Please follow the directions to create your username and password.     Your access code is: [email or web address]  Expires: 10/17/2017  9:40 AM     Your access code will  in 90 days. If you need help or a new code, please contact your [facility] or call [phone] for assistance.        Care EveryWhere ID     This is your Care EveryWhere ID. This could be used by other organizations to access your [facility] medical records  [id number]        Your Vitals Were     Pulse Temperature Pulse Oximetry BMI (Body Mass Index)          74 97  F (36.1  C) (Oral) 94% 31.3 kg/m2         Blood Pressure from Last 3 Encounters:   17 103/67   17 114/82   04/10/17 126/74    Weight from Last 3 Encounters:   17 101.8 kg (224 lb 6.4 oz)   17 101.6 kg (224 lb)   04/10/17 98.9 kg (218 lb 1.6 oz)              Today, you had the following     No orders found for display         Today's Medication Changes          These changes are accurate as of: 17  9:43 AM.  If you have any questions, ask your nurse  or doctor.               These medicines have changed or have updated prescriptions.        Dose/Directions    gabapentin 100 MG capsule   Commonly known as:  NEURONTIN   This may have changed:  additional instructions   Used for:  Essential tremor   Changed by:  Brandon Youssef MD        2 capsules 3 times a day. May increase to 3 capsules 3 times a day as tolerated   Quantity:  810 capsule   Refills:  1            Where to get your medicines      These medications were sent to [facility] - [facility] [address]  [address], [facility] [address]     Phone:  [phone]     gabapentin 100 MG capsule                Primary Care Provider Office Phone # Fax #    Juventino Glass [phone]       [facility] [address]  [facility] [address]        Equal Access to Services     KHOA ROTH : Hadii soumya jorgensen hadasho Soomaali, waaxda luqadaha, qaybta kaalmada adeegyada, yong kaminski haytony szymanski . So [facility] [phone].    ATENCIÓN: Si habla español, tiene a flores disposición servicios gratuitos de asistencia lingüística. Llame al [phone].    We comply with applicable federal civil rights laws and [place] laws. We do not discriminate on the basis of race, color, national origin, age, disability sex, sexual orientation or gender identity.            Thank you!     Thank you for choosing [facility]  for your care. Our goal is always to provide you with excellent care. Hearing back from our patients is one way we can continue to improve our services. Please take a few minutes to complete the written survey that you may receive in the mail after your visit with us. Thank you!             Your Updated Medication List - Protect others around you: Learn how to safely use, store and throw away your medicines at www.disposemymeds.org.          This list is accurate as of: 7/19/17  9:43 AM.  Always use your most recent  med list.                   Brand Name Dispense Instructions for use Diagnosis    ASPIRIN PO      Take 81 mg by mouth daily        atorvastatin 80 MG tablet    LIPITOR    90 tablet    TAKE 1 TABLET EVERY DAY    Hyperlipidemia with target LDL less than 100       DAILY MULTIVITAMIN PO      Take 1 tablet by mouth daily        gabapentin 100 MG capsule    NEURONTIN    810 capsule    2 capsules 3 times a day. May increase to 3 capsules 3 times a day as tolerated    Essential tremor       glucosamine-chondroitinoitin Caps      Take 1 tablet by mouth 2 times daily        losartan 25 MG tablet    COZAAR    90 tablet    TAKE 1 TABLET EVERY DAY    CKD (chronic kidney disease) stage 3, GFR 30-59 ml/min, Essential hypertension with goal blood pressure less than 130/80       metoprolol 25 MG 24 hr tablet    TOPROL-XL    45 tablet    Take 1/2 tablet daily    Coronary artery disease involving coronary bypass graft of native heart without angina pectoris, Essential hypertension with goal blood pressure less than 130/80       OMEGA-3 FISH OIL PO      Take 1 capsule by mouth 2 times daily (with meals)        tamsulosin 0.4 MG capsule    FLOMAX    90 capsule    TAKE 1 CAPSULE EVERY DAY    Benign non-nodular prostatic hyperplasia without lower urinary tract symptoms